# Patient Record
Sex: MALE | Race: WHITE | NOT HISPANIC OR LATINO | Employment: FULL TIME | URBAN - METROPOLITAN AREA
[De-identification: names, ages, dates, MRNs, and addresses within clinical notes are randomized per-mention and may not be internally consistent; named-entity substitution may affect disease eponyms.]

---

## 2018-02-19 ENCOUNTER — OFFICE VISIT (OUTPATIENT)
Dept: URGENT CARE | Facility: URGENT CARE | Age: 28
End: 2018-02-19
Payer: COMMERCIAL

## 2018-02-19 VITALS
DIASTOLIC BLOOD PRESSURE: 70 MMHG | WEIGHT: 195 LBS | RESPIRATION RATE: 20 BRPM | TEMPERATURE: 98.9 F | HEART RATE: 93 BPM | SYSTOLIC BLOOD PRESSURE: 120 MMHG

## 2018-02-19 DIAGNOSIS — R07.0 THROAT PAIN: Primary | ICD-10-CM

## 2018-02-19 DIAGNOSIS — R68.83 CHILLS (WITHOUT FEVER): ICD-10-CM

## 2018-02-19 LAB
BASOPHILS # BLD AUTO: 0 10E9/L (ref 0–0.2)
BASOPHILS NFR BLD AUTO: 0.2 %
DEPRECATED S PYO AG THROAT QL EIA: NORMAL
DIFFERENTIAL METHOD BLD: NORMAL
EOSINOPHIL # BLD AUTO: 0.1 10E9/L (ref 0–0.7)
EOSINOPHIL NFR BLD AUTO: 1.3 %
ERYTHROCYTE [DISTWIDTH] IN BLOOD BY AUTOMATED COUNT: 12.7 % (ref 10–15)
HCT VFR BLD AUTO: 44.1 % (ref 40–53)
HETEROPH AB SER QL: NEGATIVE
HGB BLD-MCNC: 14.8 G/DL (ref 13.3–17.7)
LYMPHOCYTES # BLD AUTO: 1.9 10E9/L (ref 0.8–5.3)
LYMPHOCYTES NFR BLD AUTO: 20.9 %
MCH RBC QN AUTO: 29.5 PG (ref 26.5–33)
MCHC RBC AUTO-ENTMCNC: 33.6 G/DL (ref 31.5–36.5)
MCV RBC AUTO: 88 FL (ref 78–100)
MONOCYTES # BLD AUTO: 0.4 10E9/L (ref 0–1.3)
MONOCYTES NFR BLD AUTO: 4.3 %
NEUTROPHILS # BLD AUTO: 6.6 10E9/L (ref 1.6–8.3)
NEUTROPHILS NFR BLD AUTO: 73.3 %
PLATELET # BLD AUTO: 221 10E9/L (ref 150–450)
RBC # BLD AUTO: 5.01 10E12/L (ref 4.4–5.9)
SPECIMEN SOURCE: NORMAL
WBC # BLD AUTO: 9 10E9/L (ref 4–11)

## 2018-02-19 PROCEDURE — 87880 STREP A ASSAY W/OPTIC: CPT | Performed by: PHYSICIAN ASSISTANT

## 2018-02-19 PROCEDURE — 99203 OFFICE O/P NEW LOW 30 MIN: CPT | Performed by: PHYSICIAN ASSISTANT

## 2018-02-19 PROCEDURE — 86308 HETEROPHILE ANTIBODY SCREEN: CPT | Performed by: PHYSICIAN ASSISTANT

## 2018-02-19 PROCEDURE — 85025 COMPLETE CBC W/AUTO DIFF WBC: CPT | Performed by: PHYSICIAN ASSISTANT

## 2018-02-19 PROCEDURE — 87081 CULTURE SCREEN ONLY: CPT | Performed by: PHYSICIAN ASSISTANT

## 2018-02-19 PROCEDURE — 36415 COLL VENOUS BLD VENIPUNCTURE: CPT | Performed by: PHYSICIAN ASSISTANT

## 2018-02-19 RX ORDER — IBUPROFEN 800 MG/1
800 TABLET, FILM COATED ORAL EVERY 8 HOURS PRN
Qty: 30 TABLET | Refills: 1 | Status: SHIPPED | OUTPATIENT
Start: 2018-02-19 | End: 2021-06-09

## 2018-02-19 RX ORDER — IBUPROFEN 800 MG/1
800 TABLET, FILM COATED ORAL EVERY 8 HOURS PRN
Qty: 30 TABLET | Refills: 1 | Status: SHIPPED | OUTPATIENT
Start: 2018-02-19 | End: 2018-02-19

## 2018-02-19 NOTE — NURSING NOTE
Chief Complaint   Patient presents with     Pharyngitis     st,swollen glands for 2 days,difficult to swallow       Initial /70 (Cuff Size: Adult Regular)  Pulse 93  Temp 98.9  F (37.2  C) (Oral)  Resp 20  Wt 195 lb (88.5 kg) There is no height or weight on file to calculate BMI.  Medication Reconciliation: complete Rose URIOSTEGUI

## 2018-02-19 NOTE — PROGRESS NOTES
SUBJECTIVE:  Jerson Moreno is a 27 year old male with a chief complaint of sore throat.  Onset of symptoms was 2 day(s) ago.    Course of illness: still present.  Severity mild and moderate  Current and Associated symptoms: throat pain  Treatment measures tried include Tylenol/Ibuprofen.  Predisposing factors include none.    No past medical history on file.     ALLERGIES  No Known Allergies     Social History   Substance Use Topics     Smoking status: Current Every Day Smoker     Smokeless tobacco: Never Used      Comment: 3-4 cigs per day     Alcohol use Not on file       ROS:  CONSTITUTIONAL:POSITIVE  for fever  INTEGUMENTARY/SKIN: NEGATIVE for worrisome rashes, moles or lesions  ENT/MOUTH: POSITIVE for throat pain  RESP:NEGATIVE for significant cough or SOB  CV: NEGATIVE for chest pain, palpitations or peripheral edema  GI: NEGATIVE for nausea, abdominal pain, heartburn, or change in bowel habits  MUSCULOSKELETAL: NEGATIVE for significant arthralgias or myalgia  NEURO: NEGATIVE for weakness, dizziness or paresthesias    OBJECTIVE:   /70 (Cuff Size: Adult Regular)  Pulse 93  Temp 98.9  F (37.2  C) (Oral)  Resp 20  Wt 195 lb (88.5 kg)  GENERAL APPEARANCE: healthy, alert and no distress  EYES: EOMI,  PERRL, conjunctiva clear  HENT: TM's normal bilaterally and tonsillar erythema  NECK: supple, non-tender to palpation, no adenopathy noted  RESP: lungs clear to auscultation - no rales, rhonchi or wheezes  CV: regular rates and rhythm, normal S1 S2, no murmur noted  SKIN: no suspicious lesions or rashes    Results for orders placed or performed in visit on 02/19/18   CBC with platelets differential   Result Value Ref Range    WBC 9.0 4.0 - 11.0 10e9/L    RBC Count 5.01 4.4 - 5.9 10e12/L    Hemoglobin 14.8 13.3 - 17.7 g/dL    Hematocrit 44.1 40.0 - 53.0 %    MCV 88 78 - 100 fl    MCH 29.5 26.5 - 33.0 pg    MCHC 33.6 31.5 - 36.5 g/dL    RDW 12.7 10.0 - 15.0 %    Platelet Count 221 150 - 450 10e9/L    Diff  Method Automated Method     % Neutrophils 73.3 %    % Lymphocytes 20.9 %    % Monocytes 4.3 %    % Eosinophils 1.3 %    % Basophils 0.2 %    Absolute Neutrophil 6.6 1.6 - 8.3 10e9/L    Absolute Lymphocytes 1.9 0.8 - 5.3 10e9/L    Absolute Monocytes 0.4 0.0 - 1.3 10e9/L    Absolute Eosinophils 0.1 0.0 - 0.7 10e9/L    Absolute Basophils 0.0 0.0 - 0.2 10e9/L   Mononucleosis screen   Result Value Ref Range    Mononucleosis Screen Negative NEG^Negative   Strep, Rapid Screen   Result Value Ref Range    Specimen Description Throat     Rapid Strep A Screen       NEGATIVE: No Group A streptococcal antigen detected by immunoassay, await culture report.         ASSESSMENT/PLAN:    ICD-10-CM    1. Throat pain R07.0 Strep, Rapid Screen     Beta strep group A culture     CBC with platelets differential     Mononucleosis screen     magic mouthwash (ENTER INGREDIENTS IN COMMENTS) suspension     ibuprofen (ADVIL/MOTRIN) 800 MG tablet     DISCONTINUED: ibuprofen (ADVIL/MOTRIN) 800 MG tablet   2. Chills (without fever) R68.83 Strep, Rapid Screen     Beta strep group A culture     CBC with platelets differential     Mononucleosis screen     magic mouthwash (ENTER INGREDIENTS IN COMMENTS) suspension     ibuprofen (ADVIL/MOTRIN) 800 MG tablet     DISCONTINUED: ibuprofen (ADVIL/MOTRIN) 800 MG tablet           Throat pain  Chills (without fever)    PLAN:   See orders in epic.   Symptomatic treat with gargles, lozenges, and OTC analgesic as needed. Follow-up with primary clinic if not improving.  Orders Placed This Encounter     CBC with platelets differential     Mononucleosis screen     DISCONTD: ibuprofen (ADVIL/MOTRIN) 800 MG tablet     magic mouthwash (ENTER INGREDIENTS IN COMMENTS) suspension     ibuprofen (ADVIL/MOTRIN) 800 MG tablet       Strep culture pending  Follow up as needed

## 2018-02-19 NOTE — LETTER
[facility] [place]  [address] [address]  [phone]      February 19, 2018    RE:  Jerson Moreno                                                                                                                                                       [address]  [facility] [address]            To whom it may concern:    Jerson Moreno was seen in the urgent care today for throat pain.  He will miss work today.         Sincerely,        Tee Singer [facility]

## 2018-02-19 NOTE — MR AVS SNAPSHOT
"              After Visit Summary   2018    Jerson Moreno    MRN: [id number]           Patient Information     Date Of Birth          1990        Visit Information        Provider Department      2018 9:10 AM Tee Singer PA-C [facility]        Today's Diagnoses     Throat pain    -  1    Chills (without fever)           Follow-ups after your visit        Who to contact     If you have questions or need follow up information about today's clinic visit or your schedule please contact [facility] directly at [phone].  Normal or non-critical lab and imaging results will be communicated to you by Presstlerhart, letter or phone within 4 business days after the clinic has received the results. If you do not hear from us within 7 days, please contact the clinic through Presstlerhart or phone. If you have a critical or abnormal lab result, we will notify you by phone as soon as possible.  Submit refill requests through RESAAS or call your pharmacy and they will forward the refill request to us. Please allow 3 business days for your refill to be completed.          Additional Information About Your Visit        MyChart Information     RESAAS lets you send messages to your doctor, view your test results, renew your prescriptions, schedule appointments and more. To sign up, go to [email or web address] . Click on \"Log in\" on the left side of the screen, which will take you to the Welcome page. Then click on \"Sign up Now\" on the right side of the page.     You will be asked to enter the access code listed below, as well as some personal information. Please follow the directions to create your username and password.     Your access code is: [email or web address]  Expires: 2018 10:20 AM     Your access code will  in 90 days. If you need help or a new code, please call your [place] clinic or [phone].        Care EveryWhere ID     This is your Care " EveryWhere ID. This could be used by other organizations to access your [facility] medical records  [id number]        Your Vitals Were     Pulse Temperature Respirations             93 98.9  F (37.2  C) (Oral) 20          Blood Pressure from Last 3 Encounters:   02/19/18 120/70    Weight from Last 3 Encounters:   02/19/18 195 lb (88.5 kg)              We Performed the Following     Beta strep group A culture     CBC with platelets differential     Mononucleosis screen     Strep, Rapid Screen          Today's Medication Changes          These changes are accurate as of 2/19/18 10:20 AM.  If you have any questions, ask your nurse or doctor.               Start taking these medicines.        Dose/Directions    ibuprofen 800 MG tablet   Commonly known as:  ADVIL/MOTRIN   Used for:  Chills (without fever), Throat pain   Started by:  Tee Singer PA-C        Dose:  800 mg   Take 1 tablet (800 mg) by mouth every 8 hours as needed for moderate pain   Quantity:  30 tablet   Refills:  1       magic mouthwash suspension   Commonly known as:  ENTER INGREDIENTS IN COMMENTS   Used for:  Throat pain, Chills (without fever)   Started by:  Tee Singer PA-C        Dose:  5-10 mL   Swish and spit 5-10 mLs in mouth every 6 hours as needed Pharmacy please compound 4 grams of carafate susp in 30 ml of Benadryl (12.5 mg/5 ml), 60 ml Maalox and 30 ml Viscous Lidocaine   Quantity:  120 mL   Refills:  0            Where to get your medicines      These medications were sent to [facility] [address] [place] [address] - [address] AT [facility] [address]  [address], [facility] [address]     Phone:  [phone]     ibuprofen 800 MG tablet         Some of these will need a paper prescription and others can be bought over the counter.  Ask your nurse if you have questions.     Bring a paper prescription for each of these medications     magic mouthwash suspension                Primary Care  Provider Fax #    Physician No Ref-Primary [phone]       No address on file        Equal Access to Services     JEREMIAH MARY : Hadii sherrell nelson ankush Mendoza, wajennda lulovely, anishata kajose arias, mary ann avinashin hayaan applepablo martin laJhoanviviane moser. So [facility] [phone].    ATENCIÓN: Si habla español, tiene a miranda disposición servicios gratuitos de asistencia lingüística. Llame al [phone].    We comply with applicable federal civil rights laws and [place] laws. We do not discriminate on the basis of race, color, national origin, age, disability, sex, sexual orientation, or gender identity.            Thank you!     Thank you for choosing [facility]  for your care. Our goal is always to provide you with excellent care. Hearing back from our patients is one way we can continue to improve our services. Please take a few minutes to complete the written survey that you may receive in the mail after your visit with us. Thank you!             Your Updated Medication List - Protect others around you: Learn how to safely use, store and throw away your medicines at www.disposemymeds.org.          This list is accurate as of 2/19/18 10:20 AM.  Always use your most recent med list.                   Brand Name Dispense Instructions for use Diagnosis    ibuprofen 800 MG tablet    ADVIL/MOTRIN    30 tablet    Take 1 tablet (800 mg) by mouth every 8 hours as needed for moderate pain    Chills (without fever), Throat pain       magic mouthwash suspension    ENTER INGREDIENTS IN COMMENTS    120 mL    Swish and spit 5-10 mLs in mouth every 6 hours as needed Pharmacy please compound 4 grams of carafate susp in 30 ml of Benadryl (12.5 mg/5 ml), 60 ml Maalox and 30 ml Viscous Lidocaine    Throat pain, Chills (without fever)

## 2018-02-20 LAB
BACTERIA SPEC CULT: NORMAL
SPECIMEN SOURCE: NORMAL

## 2018-03-14 ENCOUNTER — OFFICE VISIT (OUTPATIENT)
Dept: URGENT CARE | Facility: URGENT CARE | Age: 28
End: 2018-03-14
Payer: COMMERCIAL

## 2018-03-14 VITALS
SYSTOLIC BLOOD PRESSURE: 118 MMHG | HEART RATE: 70 BPM | WEIGHT: 194.4 LBS | RESPIRATION RATE: 16 BRPM | DIASTOLIC BLOOD PRESSURE: 68 MMHG | TEMPERATURE: 98.1 F

## 2018-03-14 DIAGNOSIS — M25.511 ACUTE PAIN OF RIGHT SHOULDER: Primary | ICD-10-CM

## 2018-03-14 PROCEDURE — 99213 OFFICE O/P EST LOW 20 MIN: CPT | Performed by: PHYSICIAN ASSISTANT

## 2018-03-14 RX ORDER — METHYLPREDNISOLONE 4 MG
TABLET, DOSE PACK ORAL
Qty: 21 TABLET | Refills: 0 | Status: SHIPPED | OUTPATIENT
Start: 2018-03-14 | End: 2019-08-08

## 2018-03-14 RX ORDER — NICOTINE 21 MG/24HR
1 PATCH, TRANSDERMAL 24 HOURS TRANSDERMAL
COMMUNITY
Start: 2018-01-30 | End: 2021-06-09

## 2018-03-14 NOTE — MR AVS SNAPSHOT
After Visit Summary   3/14/2018    Jerson Moreno    MRN: [id number]           Patient Information     Date Of Birth          1990        Visit Information        Provider Department      3/14/2018 4:10 PM Xiomara Rodriguez PA-C [facility]        Today's Diagnoses     Acute pain of right shoulder    -  1      Care Instructions    (M25.511) Acute pain of right shoulder  (primary encounter diagnosis)  Comment:   Plan: methylPREDNISolone (MEDROL DOSEPAK) 4 MG         tablet, ORTHOPEDICS ADULT REFERRAL          Follow up with Orthopedics           Follow-ups after your visit        Additional Services     ORTHOPEDICS ADULT REFERRAL       Your provider has referred you to: [facility] [phone]   [email or web address]    Please be aware that coverage of these services is subject to the terms and limitations of your health insurance plan.  Call member services at your health plan with any benefit or coverage questions.      Please bring the following to your appointment:    >>   Any x-rays, CTs or MRIs which have been performed.  Contact the facility where they were done to arrange for  prior to your scheduled appointment.    >>   List of current medications   >>   This referral request   >>   Any documents/labs given to you for this referral                  Who to contact     If you have questions or need follow up information about today's clinic visit or your schedule please contact [facility] directly at [phone].  Normal or non-critical lab and imaging results will be communicated to you by MyChart, letter or phone within 4 business days after the clinic has received the results. If you do not hear from us within 7 days, please contact the clinic through MyChart or phone. If you have a critical or abnormal lab result, we will notify you by phone as soon as possible.  Submit refill  "requests through Recurly or call your pharmacy and they will forward the refill request to us. Please allow 3 business days for your refill to be completed.          Additional Information About Your Visit        "[employer] [place] Information     Recurly lets you send messages to your doctor, view your test results, renew your prescriptions, schedule appointments and more. To sign up, go to [email or web address] . Click on \"Log in\" on the left side of the screen, which will take you to the Welcome page. Then click on \"Sign up Now\" on the right side of the page.     You will be asked to enter the access code listed below, as well as some personal information. Please follow the directions to create your username and password.     Your access code is: [email or web address]  Expires: 2018 11:20 AM     Your access code will  in 90 days. If you need help or a new code, please call your [facility] clinic or [phone].        Care EveryWhere ID     This is your Care EveryWhere ID. This could be used by other organizations to access your [facility] medical records  [id number]        Your Vitals Were     Pulse Temperature Respirations             70 98.1  F (36.7  C) (Oral) 16          Blood Pressure from Last 3 Encounters:   18 118/68   18 120/70    Weight from Last 3 Encounters:   18 194 lb 6.4 oz (88.2 kg)   18 195 lb (88.5 kg)              We Performed the Following     ORTHOPEDICS ADULT REFERRAL          Today's Medication Changes          These changes are accurate as of 3/14/18  5:17 PM.  If you have any questions, ask your nurse or doctor.               Start taking these medicines.        Dose/Directions    methylPREDNISolone 4 MG tablet   Commonly known as:  MEDROL DOSEPAK   Used for:  Acute pain of right shoulder   Started by:  Xiomara Rodriguez PA-C        Follow package instructions   Quantity:  21 tablet   Refills:  0            Where to get your medicines      These medications were " sent to [facility] - [place], [place] - [address] AT [facility] [place] [address]  [address], [facility] [address]     Phone:  [phone]     methylPREDNISolone 4 MG tablet                Primary Care Provider Fax #    Physician No Ref-Primary [phone]       No address on file        Equal Access to Services     JEREMIAH MARY : Hadii aad ku hadasho Soomaali, waaxda luqadaha, qaybta kaalmada adeegyada, waxay idiin hayaan adeeg kharash la'dayan ah. So [facility] [phone].    ATENCIÓN: Si habla espsarai, tiene a miranda disposición servicios gratuitos de asistencia lingüística. Arpaname al [phone].    We comply with applicable federal civil rights laws and [place] laws. We do not discriminate on the basis of race, color, national origin, age, disability, sex, sexual orientation, or gender identity.            Thank you!     Thank you for choosing [facility]  for your care. Our goal is always to provide you with excellent care. Hearing back from our patients is one way we can continue to improve our services. Please take a few minutes to complete the written survey that you may receive in the mail after your visit with us. Thank you!             Your Updated Medication List - Protect others around you: Learn how to safely use, store and throw away your medicines at www.disposemymeds.org.          This list is accurate as of 3/14/18  5:17 PM.  Always use your most recent med list.                   Brand Name Dispense Instructions for use Diagnosis    ibuprofen 800 MG tablet    ADVIL/MOTRIN    30 tablet    Take 1 tablet (800 mg) by mouth every 8 hours as needed for moderate pain    Chills (without fever), Throat pain       magic mouthwash suspension    ENTER INGREDIENTS IN COMMENTS    120 mL    Swish and spit 5-10 mLs in mouth every 6 hours as needed Pharmacy please compound 4 grams of carafate susp in 30 ml of Benadryl (12.5 mg/5 ml), 60 ml Maalox  and 30 ml Viscous Lidocaine    Throat pain, Chills (without fever)       methylPREDNISolone 4 MG tablet    MEDROL DOSEPAK    21 tablet    Follow package instructions    Acute pain of right shoulder       nicotine 21 MG/24HR 24 hr patch    NICODERM CQ     1 patch

## 2018-03-14 NOTE — LETTER
[facility]  [address] [address]  [phone]      March 14, 2018    RE:  Jerson BELLAMY Josh                                                                                                                                                       [address]  [facility] [address]            To whom it may concern:    Jerson BELLAMY Josh was seen in clinic today.  He may return to work on Friday.          Sincerely,        Xiomara Chery    [facility]

## 2018-03-14 NOTE — PATIENT INSTRUCTIONS
(M25.511) Acute pain of right shoulder  (primary encounter diagnosis)  Comment:   Plan: methylPREDNISolone (MEDROL DOSEPAK) 4 MG         tablet, ORTHOPEDICS ADULT REFERRAL          Follow up with Orthopedics

## 2018-03-14 NOTE — PROGRESS NOTES
SUBJECTIVE:  Chief Complaint   Patient presents with     Shoulder right     Rt shoulder pain x about 1 week      Jerson Moreno is a 27 year old male presents with a chief complaint of   1) right shoulder pain for the past week.  Onset was after holding himself up, hurt his right shoulder.  ROM is decreased.   Denies any numbness or tingling.      He has had right shoulder pain in the past that usually resolves with ice and ibuprofen.     Ice and ibuprofen are not offering much relief today.      Patient is right hand dominant.     No past medical history on file.     Previous right shoulder pain  Prostitis  Chemical dependency [id number]    There is no problem list on file for this patient.    Social History   Substance Use Topics     Smoking status: Current Every Day Smoker     Smokeless tobacco: Never Used      Comment: 3-4 cigs per day     Alcohol use Not on file       ROS:  CONSTITUTIONAL:NEGATIVE for fever, chills, change in weight  INTEGUMENTARY/SKIN: NEGATIVE for worrisome rashes, moles or lesions  MUSCULOSKELETAL: as per HPI  NEURO: NEGATIVE for weakness, dizziness or paresthesias  Review of systems negative except as stated above.    EXAM:   /68  Pulse 70  Temp 98.1  F (36.7  C) (Oral)  Resp 16  Wt 194 lb 6.4 oz (88.2 kg)  Gen: healthy,alert,no distress  Extremity: right shoulder: ROM is decreased secondary to pain.  There is not compromise to the distal circulation.  GENERAL APPEARANCE: healthy, alert and no distress  SKIN: no suspicious lesions or rashes  NEURO: Normal strength and tone, sensory exam grossly normal, mentation intact and speech normal    X-RAY was not done.    (M25.511) Acute pain of right shoulder  (primary encounter diagnosis)  Comment:   Plan: methylPREDNISolone (MEDROL DOSEPAK) 4 MG         tablet, ORTHOPEDICS ADULT REFERRAL          Follow up with Orthopedics   Patient expresses understanding and agreement with the assessment and plan as above.

## 2019-08-08 ENCOUNTER — OFFICE VISIT (OUTPATIENT)
Dept: URGENT CARE | Facility: URGENT CARE | Age: 29
End: 2019-08-08
Payer: COMMERCIAL

## 2019-08-08 VITALS
OXYGEN SATURATION: 100 % | TEMPERATURE: 98.3 F | WEIGHT: 199 LBS | DIASTOLIC BLOOD PRESSURE: 68 MMHG | HEART RATE: 60 BPM | RESPIRATION RATE: 16 BRPM | HEIGHT: 71 IN | SYSTOLIC BLOOD PRESSURE: 118 MMHG | BODY MASS INDEX: 27.86 KG/M2

## 2019-08-08 DIAGNOSIS — M54.2 DISCOMFORT OF NECK: Primary | ICD-10-CM

## 2019-08-08 DIAGNOSIS — M25.561 POSTERIOR KNEE PAIN, RIGHT: ICD-10-CM

## 2019-08-08 DIAGNOSIS — R00.1 BRADYCARDIA: ICD-10-CM

## 2019-08-08 DIAGNOSIS — R07.89 CHEST DISCOMFORT: ICD-10-CM

## 2019-08-08 LAB
D DIMER PPP FEU-MCNC: <0.3 UG/ML FEU (ref 0–0.5)
TROPONIN I SERPL-MCNC: <0.015 UG/L (ref 0–0.04)

## 2019-08-08 PROCEDURE — 85379 FIBRIN DEGRADATION QUANT: CPT | Performed by: FAMILY MEDICINE

## 2019-08-08 PROCEDURE — 93000 ELECTROCARDIOGRAM COMPLETE: CPT | Performed by: FAMILY MEDICINE

## 2019-08-08 PROCEDURE — 99215 OFFICE O/P EST HI 40 MIN: CPT | Performed by: FAMILY MEDICINE

## 2019-08-08 PROCEDURE — 84484 ASSAY OF TROPONIN QUANT: CPT | Performed by: FAMILY MEDICINE

## 2019-08-08 PROCEDURE — 36415 COLL VENOUS BLD VENIPUNCTURE: CPT | Performed by: FAMILY MEDICINE

## 2019-08-08 RX ORDER — BUPROPION HYDROCHLORIDE 300 MG/1
TABLET ORAL
Refills: 1 | COMMUNITY
Start: 2019-06-16 | End: 2021-06-09

## 2019-08-08 ASSESSMENT — MIFFLIN-ST. JEOR: SCORE: 1894.79

## 2019-08-08 NOTE — PATIENT INSTRUCTIONS
Try heat packs to the back of the leg and take tylenol/ibuprofen every 4-6 hours for pain  -- if pain hasn't improved in the next week see your doctor  -- if the discomfort gets worse please return right away to be seen    If throat /neck discomfort gets worse return immediately to be re-evaluated    If you have difficulty breathing or chest pain please return to be seen

## 2019-08-08 NOTE — PROGRESS NOTES
"Subjective:   Jerson Moreno is a 28 year old male who presents for   Chief Complaint   Patient presents with     Urgent Care     Pain behind right knee, chest pain, and tightness around neck for 2 days     Denies FH of blood clots or early heart attack  Hx of stroke in his grandfather in mid fifties    Right knee discomfort that has a burning/itchy/hotness sensation. Apparently there was some tenderness in the back of knee possibly along the tendon. May have tweaked his knee a week ago but nothing significant. Can walk on his leg just fine, actually may make it worse. No swelling in his leg.     Over the last day he has this heaviness in his upper chest around his collar bone. Can breath deep just fine. No left sided chest discomfort. Denies injuries or lifting. Denies hx of allergies. Denies sore throat. Slight lightheadedness with maybe some blurry vision when he woke up in the middle of the night then happened again today, lasts 30 secs long. No headache reported. NO neck pain. Eating and rinking    No hx of anxiety but wonders if he is making himself anxious    SH: current smoker approximately 10-11 cigs/day. Accompanied today by his girlfriend. Smokes cannabis but no use today    Meds: wellbutrin      There are no active problems to display for this patient.      Current Outpatient Medications   Medication     buPROPion (WELLBUTRIN XL) 300 MG 24 hr tablet     ibuprofen (ADVIL/MOTRIN) 800 MG tablet     nicotine (NICODERM CQ) 21 MG/24HR 24 hr patch     No current facility-administered medications for this visit.        ROS:   ROS: 10 point ROS neg other than the symptoms noted above in the HPI.    Objective:   /68   Pulse 60   Temp 98.3  F (36.8  C) (Oral)   Resp 16   Ht 1.803 m (5' 11\")   Wt 90.3 kg (199 lb)   SpO2 100%   BMI 27.75 kg/m  , Body mass index is 27.75 kg/m .  Gen:   well-nourished, sitting in chair comfortably, normal mentation  HEENT: EOMI, sclera anicteric, Head normocephalic, ; nares " patent; moist mucous membranes, 3+ tonsils without trismus  Neck: trachea midline, no thyromegaly, normal ROM  CV:  Hemodynamically stable, normal rate on my exam, using machine HR shows rate of 66-68  Pulm:  no increased work of breathing , CTAB, no wheezes/rales/rhonchi   R knee: slight discomfort of posterior knee with palpation but no palpable masses  Extrem: no cyanosis, edema or clubbing  Skin: no obvious rashes or abnormalities  Psych: mildly anxious, linear thoughts, normal rate of speech  Neuro : CN II-XII intact  Gait: normal  MSK: gross movement of all 4 extremities    Results for orders placed or performed in visit on 08/08/19   D dimer, quantitative   Result Value Ref Range    D Dimer <0.3 0.0 - 0.50 ug/ml FEU   Troponin I   Result Value Ref Range    Troponin I ES <0.015 0.000 - 0.045 ug/L     EKG: rate 51, sinus bradycardia, , QTc 419    Assessment & Plan:   Jerson Moreno, 28 year old male who presents with:    Chest discomfort  Has risk factors with his FH and his body habitus. Fortunately has a normal EKG and negative troponin. Without breathing difficulties, has normal lung exam and stable vital signs. The discomfort is more along is upper chest around collar bone area. I see no masses or swelling. No crepitus felt. Close monitoring of symptoms but reassurance provided. Symptoms not consistent with indigestion. If worsening symptoms return immediately to be seen.   - D dimer, quantitative  - Troponin I  - EKG 12-lead complete w/read - Clinics    Discomfort of neck  Normal ROM, without fevers and has normal mentation.     Bradycardia  Rate 51 on EKG but on exam and using pulse ox machine he is in the mid 60's. Asymptomatic without lightheadedness/dizziness.     Posterior knee pain, right  Exam not convincing for blood clot. Stable gait. Mild discomfort. D-dimer negative which is reassuring. Possible strain of a hamstring tendon or muscle, I recommended ibuprofen/tylenol and heat packs.   This  seems to be a minor complaint in the big picture of his urgent care visit today      Thai Dodson MD   [facility]    The use of Dragon/[employer] dictation services may have been used to construct the content in this note; any grammatical or spelling errors are non-intentional. Please contact the author of this note directly if you are in need of any clarification.

## 2019-08-19 ENCOUNTER — OFFICE VISIT (OUTPATIENT)
Dept: ORTHOPEDICS | Facility: CLINIC | Age: 29
End: 2019-08-19
Payer: COMMERCIAL

## 2019-08-19 ENCOUNTER — OFFICE VISIT (OUTPATIENT)
Dept: URGENT CARE | Facility: URGENT CARE | Age: 29
End: 2019-08-19
Payer: COMMERCIAL

## 2019-08-19 VITALS
HEART RATE: 63 BPM | WEIGHT: 198.5 LBS | DIASTOLIC BLOOD PRESSURE: 71 MMHG | OXYGEN SATURATION: 97 % | SYSTOLIC BLOOD PRESSURE: 131 MMHG | BODY MASS INDEX: 27.69 KG/M2 | TEMPERATURE: 97.9 F

## 2019-08-19 VITALS
SYSTOLIC BLOOD PRESSURE: 131 MMHG | HEIGHT: 71 IN | BODY MASS INDEX: 27.72 KG/M2 | WEIGHT: 198 LBS | DIASTOLIC BLOOD PRESSURE: 71 MMHG

## 2019-08-19 DIAGNOSIS — M79.604 PAIN IN BOTH LOWER EXTREMITIES: ICD-10-CM

## 2019-08-19 DIAGNOSIS — M54.50 ACUTE BILATERAL LOW BACK PAIN WITHOUT SCIATICA: ICD-10-CM

## 2019-08-19 DIAGNOSIS — M79.605 PAIN IN BOTH LOWER EXTREMITIES: ICD-10-CM

## 2019-08-19 DIAGNOSIS — M54.9 CHRONIC BILATERAL BACK PAIN, UNSPECIFIED BACK LOCATION: Primary | ICD-10-CM

## 2019-08-19 DIAGNOSIS — G89.29 CHRONIC BILATERAL BACK PAIN, UNSPECIFIED BACK LOCATION: Primary | ICD-10-CM

## 2019-08-19 DIAGNOSIS — M62.830 LUMBAR PARASPINAL MUSCLE SPASM: Primary | ICD-10-CM

## 2019-08-19 PROCEDURE — 99214 OFFICE O/P EST MOD 30 MIN: CPT | Performed by: FAMILY MEDICINE

## 2019-08-19 PROCEDURE — 99204 OFFICE O/P NEW MOD 45 MIN: CPT | Performed by: FAMILY MEDICINE

## 2019-08-19 RX ORDER — CYCLOBENZAPRINE HCL 5 MG
TABLET ORAL
Qty: 30 TABLET | Refills: 0 | Status: SHIPPED | OUTPATIENT
Start: 2019-08-19 | End: 2021-06-09

## 2019-08-19 RX ORDER — METHYLPREDNISOLONE 4 MG
TABLET, DOSE PACK ORAL
Qty: 21 TABLET | Refills: 0 | Status: SHIPPED | OUTPATIENT
Start: 2019-08-19 | End: 2019-08-25

## 2019-08-19 ASSESSMENT — ENCOUNTER SYMPTOMS
SENSORY CHANGE: 0
TINGLING: 0
BACK PAIN: 1
FOCAL WEAKNESS: 0

## 2019-08-19 ASSESSMENT — MIFFLIN-ST. JEOR: SCORE: 1890.25

## 2019-08-19 NOTE — LETTER
8/19/2019         RE: Jerson Moreno  [id number]  [facility] [address]        Dear Colleague,    Thank you for referring your patient, Jerson Moreno, to the  SPORTS MEDICINE. Please see a copy of my visit note below.    [facility]   Clinic Visit s Aug 19, 2019    PCP: [facility]      Jerson is a 28 year old male who is seen as self referral for   Chief Complaint   Patient presents with     Lower Back - Pain       Injury: Reports insidious onset without acute precipitating event.      Location of Pain: bilateral low back , radiating to bilateral posterior upper legs   Duration of Pain: 3 week(s)  Rating of Pain at worst: 8/10  Rating of Pain Currently: 6/10  Pain is better with: activity avoidance, laying down  Pain is worse with: driving, sitting,   Treatment so far consists of: cannabis, rest  Associated symptoms: numbness and tingling in leg  Recent imaging completed: No recent imaging completed.  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 No pertinent family history.    Social History     Socioeconomic History     Marital status: Single     Spouse name: Not on file     Number of children: Not on file     Years of education: Not on file     Highest education level: Not on file   Occupational History     Not on file   Social Needs     Financial  "resource strain: Not on file     Food insecurity:     Worry: Not on file     Inability: Not on file     Transportation needs:     Medical: Not on file     Non-medical: Not on file   Tobacco Use     Smoking status: Current Every Day Smoker     Smokeless tobacco: Never Used     Tobacco comment: 3-4 cigs per day   Substance and Sexual Activity     Alcohol use: Not on file     Drug use: Not on file       Past Surgical History:   Procedure Laterality Date     NO HISTORY OF SURGERY             Review of Systems   Musculoskeletal: Positive for back pain.   Neurological: Negative for tingling, sensory change and focal weakness.   All other systems reviewed and are negative.        Physical Exam  /71   Ht 1.803 m (5' 11\")   Wt 89.8 kg (198 lb)   BMI 27.62 kg/m     Constitutional:well-developed, well-nourished, and in no distress.   Cardiovascular: Intact distal pulses.    Neurological: alert. Gait shuffling, difficulty transitioning from sit to stand, then loosens up  Skin: Skin is warm and dry.   Psychiatric: Mood and affect normal.   Respiratory: unlabored, speaks in full sentences  Lymph: no LAD, no lymphangitis            Back Exam     Tenderness   The patient is experiencing tenderness in the sacroiliac.    Range of Motion   Extension: 20 (no pain, limited motion)   Flexion: abnormal Back flexion: hands to mid shins, \"tight\"   Lateral bend right: normal   Lateral bend left: abnormal Back lateral bend left: painful to left.   Rotation right: normal   Back rotation left: painful to left.     Muscle Strength   The patient has normal back strength.    Tests   Straight leg raise right: negative  Straight leg raise left: negative    Other   Sensation: normal  Gait: normal   Erythema: no back redness  Scars: absent          ASSESSMENT/PLAN    ICD-10-CM    1. Lumbar paraspinal muscle spasm M62.830 cyclobenzaprine (FLEXERIL) 5 MG tablet   2. Acute bilateral low back pain without sciatica M54.5 LUCINDA PT, HAND, AND " CHIROPRACTIC REFERRAL     Acute low back pain with some radiating referred pain but not radicular in nature.  He was given oral steroids by urgent care and I encouraged him to proceed with this.  We will add muscle relaxers for spasms that are affecting sleep and to send him to acute spine rehab.  Follow-up as needed.  Letter written to reduce work hours for 1 week then return if tolerated.      Again, thank you for allowing me to participate in the care of your patient.        Sincerely,        Marlon Stone MD

## 2019-08-19 NOTE — LETTER
SPORTS MEDICINE  [address]  [facility] [address]  Phone: [phone]  Fax: [phone]    08/19/19    Jerson Moreno  [address] [place] [address]      To whom it may concern:     Jerson has a low back injury. He should be limited to half days for the next 7 days.     Sincerely,      Marlon Stone MD

## 2019-08-19 NOTE — PROGRESS NOTES
[facility]   Clinic Visit s Aug 19, 2019    PCP: [place]      Jerson is a 28 year old male who is seen as self referral for   Chief Complaint   Patient presents with     Lower Back - Pain       Injury: Reports insidious onset without acute precipitating event.      Location of Pain: bilateral low back , radiating to bilateral posterior upper legs   Duration of Pain: 3 week(s)  Rating of Pain at worst: 8/10  Rating of Pain Currently: 6/10  Pain is better with: activity avoidance, laying down  Pain is worse with: driving, sitting,   Treatment so far consists of: cannabis, rest  Associated symptoms: numbness and tingling in leg  Recent imaging completed: No recent imaging completed.  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 No pertinent family history.    Social History     Socioeconomic History     Marital status: Single     Spouse name: Not on file     Number of children: Not on file     Years of education: Not on file     Highest education level: Not on file   Occupational History     Not on file   Social Needs     Financial resource strain: Not on file     Food insecurity:     Worry: Not on file     Inability: Not on file     Transportation needs:     Medical: Not on file     Non-medical: Not on file   Tobacco Use     Smoking status: Current Every Day  "Smoker     Smokeless tobacco: Never Used     Tobacco comment: 3-4 cigs per day   Substance and Sexual Activity     Alcohol use: Not on file     Drug use: Not on file       Past Surgical History:   Procedure Laterality Date     NO HISTORY OF SURGERY             Review of Systems   Musculoskeletal: Positive for back pain.   Neurological: Negative for tingling, sensory change and focal weakness.   All other systems reviewed and are negative.        Physical Exam  /71   Ht 1.803 m (5' 11\")   Wt 89.8 kg (198 lb)   BMI 27.62 kg/m    Constitutional:well-developed, well-nourished, and in no distress.   Cardiovascular: Intact distal pulses.    Neurological: alert. Gait shuffling, difficulty transitioning from sit to stand, then loosens up  Skin: Skin is warm and dry.   Psychiatric: Mood and affect normal.   Respiratory: unlabored, speaks in full sentences  Lymph: no LAD, no lymphangitis            Back Exam     Tenderness   The patient is experiencing tenderness in the sacroiliac.    Range of Motion   Extension: 20 (no pain, limited motion)   Flexion: abnormal Back flexion: hands to mid shins, \"tight\"   Lateral bend right: normal   Lateral bend left: abnormal Back lateral bend left: painful to left.   Rotation right: normal   Back rotation left: painful to left.     Muscle Strength   The patient has normal back strength.    Tests   Straight leg raise right: negative  Straight leg raise left: negative    Other   Sensation: normal  Gait: normal   Erythema: no back redness  Scars: absent          ASSESSMENT/PLAN    ICD-10-CM    1. Lumbar paraspinal muscle spasm M62.830 cyclobenzaprine (FLEXERIL) 5 MG tablet   2. Acute bilateral low back pain without sciatica M54.5 LUCINDA PT, HAND, AND CHIROPRACTIC REFERRAL     Acute low back pain with some radiating referred pain but not radicular in nature.  He was given oral steroids by urgent care and I encouraged him to proceed with this.  We will add muscle relaxers for spasms that " are affecting sleep and to send him to acute spine rehab.  Follow-up as needed.  Letter written to reduce work hours for 1 week then return if tolerated.

## 2019-08-19 NOTE — LETTER
[facility] [facility]  [address] [address]  [phone]      August 19, 2019    RE:  Jerson Moreno                                                                                                                                                       [id number]  [facility] [place] [address]            To whom it may concern:    Jerson Moreno is under my professional care for Medical.   He  may return to work after recheck with Orthopedics.          Sincerely,        Eduin Perez    [place] [facility]

## 2019-08-19 NOTE — PROGRESS NOTES
SUBJECTIVE:  Chief Complaint   Patient presents with     Urgent Care     Pt states sitting for long period of times causea bilatetral leg pain sxs 3x week    .ident who presents with a chief complaint of  bilateral back and legs pain.  Symptoms began 3 week(s) ago , are moderate andstill present.  Context:Injury: no  Pain exacerbated by movement   Relieved bynothing He treated it initially with no therapy.   This is the first time this type of injury has occurred to this patient.     No past medical history on file.    No past surgical history on file.    No family history on file.    Social History     Tobacco Use     Smoking status: Current Every Day Smoker     Smokeless tobacco: Never Used     Tobacco comment: 3-4 cigs per day   Substance Use Topics     Alcohol use: Not on file      No Known Allergies    ROS:CONSTITUTIONAL:NEGATIVE for fever, chills, change in weight  INTEGUMENTARY/SKIN: NEGATIVE for worrisome rashes, moles or lesions    EXAM: /71   Pulse 63   Temp 97.9  F (36.6  C) (Oral)   Wt 90 kg (198 lb 8 oz)   SpO2 97%   BMI 27.69 kg/m    Exam:leg and back  tenderness to palpation and pain with rom  GENERAL APPEARANCE: healthy, alert and no distress  EXTREMITIES: peripheral pulses normal  SKIN: no suspicious lesions or rashes  NEURO: Normal strength and tone, sensory exam grossly normal, mentation intact and speech normal      ASSESSMENT:     ICD-10-CM    1. Chronic bilateral back pain, unspecified back location M54.9 methylPREDNISolone (MEDROL DOSEPAK) 4 MG tablet therapy pack    G89.29 ORTHOPEDICS ADULT REFERRAL   2. Pain in both lower extremities M79.604 methylPREDNISolone (MEDROL DOSEPAK) 4 MG tablet therapy pack    M79.605 ORTHOPEDICS ADULT REFERRAL

## 2019-08-26 ENCOUNTER — THERAPY VISIT (OUTPATIENT)
Dept: PHYSICAL THERAPY | Facility: CLINIC | Age: 29
End: 2019-08-26
Attending: FAMILY MEDICINE
Payer: COMMERCIAL

## 2019-08-26 DIAGNOSIS — M54.50 ACUTE BILATERAL LOW BACK PAIN WITHOUT SCIATICA: ICD-10-CM

## 2019-08-26 DIAGNOSIS — M54.50 ACUTE LEFT-SIDED LOW BACK PAIN WITHOUT SCIATICA: ICD-10-CM

## 2019-08-26 PROCEDURE — 97112 NEUROMUSCULAR REEDUCATION: CPT | Mod: GP | Performed by: PHYSICAL THERAPIST

## 2019-08-26 PROCEDURE — 97161 PT EVAL LOW COMPLEX 20 MIN: CPT | Mod: GP | Performed by: PHYSICAL THERAPIST

## 2019-08-26 PROCEDURE — 97110 THERAPEUTIC EXERCISES: CPT | Mod: GP | Performed by: PHYSICAL THERAPIST

## 2019-08-26 NOTE — PROGRESS NOTES
"[facility] Initial Evaluation -- Lumbar    Date: August 26, 2019  Jerson Moreno is a 28 year old male with a B thigh condition.   Referral: [facility]--Dr. Stone  Work mechanical stresses:  Sitting, computer work, lifting/carrying  Employment status:  [employer]  Leisure mechanical stresses: sedentary--oralia--sits in office chair   Functional disability score (SHERMAN/STarT Back):  18%  VAS score (0-10): 0/10  Patient goals/expectations:  To be able to sit without leg pain.    HISTORY:    Present symptoms: R>L posterior thigh pain, B LB \"tenderness\"  Pain quality (sharp/shooting/stabbing/aching/burning/cramping):  aching   Paresthesia (yes/no):  no    Present since (onset date): 07/26/2019.     Symptoms (improving/unchanging/worsening):  improving.     Symptoms commenced as a result of: unknown   Condition occurred in the following environment:   unknown     Symptoms at onset (back/thigh/leg): R posterior thigh pain to the knee  Constant symptoms (back/thigh/leg): R>L posterior thigh pain to the knees  Intermittent symptoms (back/thigh/leg): none    Symptoms are made worse with the following: Always Sitting 20 minutes; time of day--no effect, always when still  Symptoms are made better with the following: Always Walking and Always Lying supine, always walking and standing    Disturbed sleep (yes/no):  Yes--less than 1 hour lost/night Sleeping postures (prone/sup/side R/L): supine, sides    Previous episodes (0/1-5/6-10/11+): 1 Year of first episode: 2017  Previous history: LBP 2017--muscle relaxers--resolved  Previous treatments: 7-day prednisone pack--finished today      Specific Questions:  Cough/Sneeze/Strain (pos/neg): negative  Bowel/Bladder (normal/abnormal): normal bowel; has complaints of frequent urinary urgency and sometimes unable to urinate fully--plans to f/u with GP since he had prostatitis 2 years ago.  Gait (normal/abnormal): normal  Medications " "(nil/NSAIDS/analg/steroids/anticoag/other):  Muscle relaxants, Steroids and Other - Anti-depressants and anti-inflammatories  Medical allergies:  none  General health (excellent/good/fair/poor):  good  Pertinent medical history:  Chemical dependency, Depression, Mental illness, Overweight and Smoking  Imaging (None/Xray/MRI/Other):  none  Recent or major surgery (yes/no):  No; history of cyst removal from face, ear 2007  Night pain (yes/no): no  Accidents (yes/no): no  Unexplained weight loss (yes/no): no  Barriers at home: no  Other red flags: no    EXAMINATION    Posture:   Sitting (good/fair/poor): poor  Standing (good/fair/poor):good  Lordosis (red/acc/normal): red  Correction of posture (better/worse/no effect): NE    Lateral Shift (right/left/nil): nil  Relevant (yes/no):  na  Other Observations: na    Neurological:    Motor deficit:  normal  Reflexes:  Not assessed  Sensory deficit:  normal  Dural signs:  Not assessed    Movement Loss:   Marcos Mod Min Nil Pain   Flexion    x NE   Extension  x   \"stiff\"   Side Gliding R    x NE   Side Gliding L    x \"stiff\"     Test Movements:   During: produces, abolishes, increases, decreases, no effect, centralizing, peripheralizing   After: better, worse, no better, no worse, no effect, centralized, peripheralized    Pretest symptoms standing:    Symptoms During Symptoms After ROM increased ROM decreased No Effect   FIS        Rep FIS        EIS        Rep EIS        Pretest symptoms lying: B thighs--0/10    Symptoms During Symptoms After ROM increased ROM decreased No Effect   ANY        Rep ANY        EIL No Effect No Effect      Rep EIL No Effect Better--less stiff extension    x     If required, pretest symptoms:    Symptoms During Symptoms After ROM increased ROM decreased No Effect   SGIS - R        Rep SGIS - R        SGIS - L        Rep SGIS - L          Static Tests:  Sitting slouched:    Sitting erect:    Standing slouched   Standing erect:    Lying prone in " extension:   Long sitting:      Other Tests:     Provisional Classification:  Derangement - Asymmetrical, unilateral, symptoms above knee    Principle of Management:  Education:  Posture--use of lumbar roll in sitting, avoid flexion, body mechanics, hinge pivot   Equipment provided:  none  Mechanical therapy (Y/N):  y   Extension principle:  REIL x10 reps, every 2 hours; goal 100 reps/day  Lateral Principle:    Flexion principle:    Other:      ASSESSMENT/PLAN:    Patient is a 28 year old male with lumbar complaints.  Provisional classification of derangement with directional preference for extension.  Symptoms were mild with evaluation today.  He has decreased lumbar extension ROM which improved with repeated lumbar extension exercises in lying.  He should respond well to a lumbar extension program in addition to posture and body mechanics training to improve mechanics, decrease pain, and improve overall function.     Patient has the following significant findings with corresponding treatment plan.                Diagnosis 1:  B thigh pain--lumbar  Pain -  self management, education, directional preference exercise and home program  Decreased ROM/flexibility - manual therapy, therapeutic exercise and home program  Decreased function - therapeutic activities and home program  Impaired posture - neuro re-education and home program    Cumulative Therapy Evaluation is: Low complexity.    Previous and current functional limitations:  (See Goal Flow Sheet for this information)    Short term and Long term goals: (See Goal Flow Sheet for this information)     Communication ability:  Patient appears to be able to clearly communicate and understand verbal and written communication and follow directions correctly.  Treatment Explanation - The following has been discussed with the patient:   RX ordered/plan of care  Anticipated outcomes  Possible risks and side effects  This patient would benefit from PT intervention to resume  normal activities.   Rehab potential is good.    Frequency:  1 X week, once daily  Duration:  for 4 weeks tapering to 2 X a month over 1 month  Discharge Plan:  Achieve all LTG.  Independent in home treatment program.  Reach maximal therapeutic benefit.    Please refer to the daily flowsheet for treatment today, total treatment time and time spent performing 1:1 timed codes.

## 2019-09-05 ENCOUNTER — THERAPY VISIT (OUTPATIENT)
Dept: PHYSICAL THERAPY | Facility: CLINIC | Age: 29
End: 2019-09-05
Attending: FAMILY MEDICINE
Payer: COMMERCIAL

## 2019-09-05 DIAGNOSIS — M54.50 ACUTE LEFT-SIDED LOW BACK PAIN WITHOUT SCIATICA: ICD-10-CM

## 2019-09-05 PROCEDURE — 97110 THERAPEUTIC EXERCISES: CPT | Mod: GP | Performed by: PHYSICAL THERAPIST

## 2019-09-05 PROCEDURE — 97112 NEUROMUSCULAR REEDUCATION: CPT | Mod: GP | Performed by: PHYSICAL THERAPIST

## 2019-09-05 PROCEDURE — 97140 MANUAL THERAPY 1/> REGIONS: CPT | Mod: GP | Performed by: PHYSICAL THERAPIST

## 2019-09-12 ENCOUNTER — THERAPY VISIT (OUTPATIENT)
Dept: PHYSICAL THERAPY | Facility: CLINIC | Age: 29
End: 2019-09-12
Attending: FAMILY MEDICINE
Payer: COMMERCIAL

## 2019-09-12 DIAGNOSIS — M54.50 ACUTE LEFT-SIDED LOW BACK PAIN WITHOUT SCIATICA: ICD-10-CM

## 2019-09-12 PROCEDURE — 97140 MANUAL THERAPY 1/> REGIONS: CPT | Mod: GP | Performed by: PHYSICAL THERAPIST

## 2019-09-12 PROCEDURE — 97530 THERAPEUTIC ACTIVITIES: CPT | Mod: GP | Performed by: PHYSICAL THERAPIST

## 2019-09-12 PROCEDURE — 97110 THERAPEUTIC EXERCISES: CPT | Mod: GP | Performed by: PHYSICAL THERAPIST

## 2019-09-12 NOTE — PROGRESS NOTES
Subjective:  HPI                    Objective:  System    Physical Exam    General     ROS    Assessment/Plan:    DISCHARGE REPORT    Progress reporting period is from 08/26/2019 to 09/12/2019.       SUBJECTIVE  Subjective: Patient reports he has not had the leg pain that he had when he first started.  He still has LBP but this has been an issue for quite some time.  Had increased LBP 2 nights ago after sitting for nearly the full 8 hours at work.  Went home and did the exercises which helped, and he felt fine the next morning.  He feels 85-90% back to normal at this point.      Current Pain level: 2/10.     Initial Pain level: 0/10.   Changes in function:  Yes, no sleep interruptions due to LBP, improved sitting tolerance.  Adverse reaction to treatment or activity: None    OBJECTIVE  Objective: Lumbar AROM:  flexion WNL, NE; extension 66%, NE.  Good response to extension exercises and mobilizations with consistently decreased pain.         ASSESSMENT/PLAN  Patient has been seen for 3 visits with treatment focusing on lumbar extension exercises and mobilizations in addition to posture and body mechanics training to address LB and B thigh pain.  He has responded well to lumbar extension, and symptoms have centralized to the LB.  He should be able to abolish his symptoms as he continues with his exercises over time.  He will continue with his HEP independently at this point.    Updated problem list and treatment plan: Diagnosis 1:  LBP  Pain -  self management, education, directional preference exercise and home program  Decreased ROM/flexibility - home program  Decreased function - home program  STG/LTGs have been met or progress has been made towards goals:  Yes (See Goal flow sheet completed today.)  Assessment of Progress: The patient's condition is improving.  Self Management Plans:  Patient has been instructed in a home treatment program.  Patient is independent in a home treatment program.  Patient  has been  instructed in self management of symptoms.  Patient is independent in self management of symptoms.  Jerson continues to require the following intervention to meet STG and LTG's:  PT intervention is no longer required to meet STG/LTG.    Recommendations:  This patient is ready to be discharged from therapy and continue their home treatment program.    Please refer to the daily flowsheet for treatment today, total treatment time and time spent performing 1:1 timed codes.

## 2020-02-05 ENCOUNTER — OFFICE VISIT (OUTPATIENT)
Dept: URGENT CARE | Facility: URGENT CARE | Age: 30
End: 2020-02-05
Payer: COMMERCIAL

## 2020-02-05 VITALS
WEIGHT: 198 LBS | OXYGEN SATURATION: 98 % | RESPIRATION RATE: 16 BRPM | TEMPERATURE: 98.1 F | SYSTOLIC BLOOD PRESSURE: 115 MMHG | HEART RATE: 64 BPM | DIASTOLIC BLOOD PRESSURE: 62 MMHG | BODY MASS INDEX: 27.62 KG/M2

## 2020-02-05 DIAGNOSIS — N50.812 TESTICULAR PAIN, LEFT: Primary | ICD-10-CM

## 2020-02-05 LAB
ALBUMIN UR-MCNC: NEGATIVE MG/DL
APPEARANCE UR: CLEAR
BILIRUB UR QL STRIP: NEGATIVE
COLOR UR AUTO: YELLOW
GLUCOSE UR STRIP-MCNC: NEGATIVE MG/DL
HGB UR QL STRIP: ABNORMAL
KETONES UR STRIP-MCNC: NEGATIVE MG/DL
LEUKOCYTE ESTERASE UR QL STRIP: NEGATIVE
NITRATE UR QL: NEGATIVE
PH UR STRIP: 6.5 PH (ref 5–7)
RBC #/AREA URNS AUTO: NORMAL /HPF
SOURCE: ABNORMAL
SP GR UR STRIP: 1.01 (ref 1–1.03)
UROBILINOGEN UR STRIP-ACNC: 0.2 EU/DL (ref 0.2–1)
WBC #/AREA URNS AUTO: NORMAL /HPF

## 2020-02-05 PROCEDURE — 87491 CHLMYD TRACH DNA AMP PROBE: CPT | Performed by: PHYSICIAN ASSISTANT

## 2020-02-05 PROCEDURE — 99213 OFFICE O/P EST LOW 20 MIN: CPT | Performed by: PHYSICIAN ASSISTANT

## 2020-02-05 PROCEDURE — 87591 N.GONORRHOEAE DNA AMP PROB: CPT | Performed by: PHYSICIAN ASSISTANT

## 2020-02-05 PROCEDURE — 81001 URINALYSIS AUTO W/SCOPE: CPT | Performed by: PHYSICIAN ASSISTANT

## 2020-02-05 RX ORDER — SULFAMETHOXAZOLE/TRIMETHOPRIM 800-160 MG
1 TABLET ORAL 2 TIMES DAILY
Qty: 20 TABLET | Refills: 0 | Status: SHIPPED | OUTPATIENT
Start: 2020-02-05 | End: 2020-02-15

## 2020-02-05 ASSESSMENT — ENCOUNTER SYMPTOMS
HEADACHES: 0
DIARRHEA: 0
FOCAL WEAKNESS: 0
HEMATURIA: 0
DYSURIA: 0
FLANK PAIN: 0
CHILLS: 0
ABDOMINAL PAIN: 0
VOMITING: 0
SHORTNESS OF BREATH: 0
FEVER: 0
FREQUENCY: 0
NAUSEA: 0

## 2020-02-05 NOTE — PROGRESS NOTES
HPI  February 5, 2020    HPI: Jerson Moreno is a 29 year old male who complains of moderate L testicular pain onset 10 days ago. Pain is described as a constant dull ache; it does not radiate. Symptoms are constant in duration. No treatments tried. Denies urinary sx, testicular swelling, fever/chills, abd pain, flank pain, penile discharge, genital sores/rash, N/V/D, or any other symptoms. Pt is sexually active with no new partners; he uses condoms.    Past Medical History:   Diagnosis Date     NO ACTIVE PROBLEMS      Past Surgical History:   Procedure Laterality Date     NO HISTORY OF SURGERY       Social History     Tobacco Use     Smoking status: Current Every Day Smoker     Smokeless tobacco: Never Used     Tobacco comment: 3-4 cigs per day   Substance Use Topics     Alcohol use: None     Drug use: None     Patient Active Problem List   Diagnosis     Depressive disorder     Drug abuse, marijuana     Hypercholesteremia     IVDU (intravenous drug user)     Acute left-sided low back pain without sciatica     No family history on file.     Problem list, Medication list, Allergies, and Medical/Social/Surgical histories reviewed in [facility] and updated as appropriate.      Review of Systems   Constitutional: Negative for chills and fever.   Respiratory: Negative for shortness of breath.    Cardiovascular: Negative for chest pain.   Gastrointestinal: Negative for abdominal pain, diarrhea, nausea and vomiting.   Genitourinary: Negative for dysuria, flank pain, frequency and hematuria.        Testicular pain   Skin: Negative for rash.   Neurological: Negative for focal weakness and headaches.   All other systems reviewed and are negative.        Physical Exam  Vitals signs and nursing note reviewed.   HENT:      Head: Normocephalic and atraumatic.   Cardiovascular:      Rate and Rhythm: Normal rate and regular rhythm.      Heart sounds: Normal heart sounds.   Pulmonary:      Effort: Pulmonary effort is normal.      Breath  sounds: Normal breath sounds.   Abdominal:      General: Abdomen is flat. Bowel sounds are normal.      Palpations: Abdomen is soft.      Tenderness: There is no abdominal tenderness. There is no right CVA tenderness or left CVA tenderness.   Genitourinary:     Penis: Normal.       Scrotum/Testes:         Left: Tenderness present. Mass or swelling not present.      Epididymis:      Left: Tenderness present.   Musculoskeletal: Normal range of motion.   Skin:     General: Skin is warm and dry.   Neurological:      Mental Status: He is alert and oriented to person, place, and time.       Vital Signs  /62   Pulse 64   Temp 98.1  F (36.7  C) (Oral)   Resp 16   Wt 89.8 kg (198 lb)   SpO2 98%   BMI 27.62 kg/m       Diagnostic Test Results:  Results for orders placed or performed in visit on 02/05/20 (from the past 24 hour(s))   *UA reflex to Microscopic and Culture ([facility] (except [place] and [place])   Result Value Ref Range    Color Urine Yellow     Appearance Urine Clear     Glucose Urine Negative NEG^Negative mg/dL    Bilirubin Urine Negative NEG^Negative    Ketones Urine Negative NEG^Negative mg/dL    Specific Gravity Urine 1.015 1.003 - 1.035    Blood Urine Trace (A) NEG^Negative    pH Urine 6.5 5.0 - 7.0 pH    Protein Albumin Urine Negative NEG^Negative mg/dL    Urobilinogen Urine 0.2 0.2 - 1.0 EU/dL    Nitrite Urine Negative NEG^Negative    Leukocyte Esterase Urine Negative NEG^Negative    Source Midstream Urine    Urine Microscopic   Result Value Ref Range    WBC Urine 0 - 5 OTO5^0 - 5 /HPF    RBC Urine O - 2 OTO2^O - 2 /HPF       ASSESSMENT/PLAN      ICD-10-CM    1. Testicular pain, left N50.812 *UA reflex to Microscopic and Culture ([place] and [facility] (except [facility])     Neisseria gonorrhoeae PCR     Chlamydia trachomatis PCR     Urine Microscopic     sulfamethoxazole-trimethoprim (BACTRIM DS) 800-160 MG tablet      UA unremarkable. Doubt  nephrolithiasis given constant dull ache and no CVAT or flank pain. Also doubt testicular torsion due to dull pain of 10 days' duration without acute worsening or swelling of testicle. Suspect epididymitis due to tenderness at the epididymis. G & C tests pending. Will also cover for urinary tract pathogens with Bactrim. Avoid flouroquinolones due to risk of resistance in case of gonorrhea infection. If pain worsens or pt notes swelling, he is go to the ER for imaging.      I have discussed any lab or imaging results, the patient's diagnosis, and my plan of treatment with the patient and/or family. Patient is aware to come back in if with worsening symptoms or if no relief despite treatment plan.  Patient voiced understanding and had no further questions.       Follow Up: Return in about 3 days (around 2/8/2020) for Follow up w/ primary care provider if not better.    NICOLE Hurley, PA-C  [facility]

## 2020-02-05 NOTE — PATIENT INSTRUCTIONS
If your pain worsens or you notice fever or testicular swelling, go to the ER.  We will contact you if your STD tests come back positive.    Patient Education     Epididymitis-suspected  Inflammation of the epididymis can cause pain and swelling in your scrotum. The epididymis is a small tube next to the testicle that stores sperm. Epididymitis is usually caused by an infection. In sexually active men, it is often caused by a sexually transmitted disease (STD) such as chlamydia or gonorrhea. In boys and in men over 40, it can be from bacteria from other parts of the urinary tract (not an STD infection).  Symptoms may begin with pain in the lower belly (abdomen) or low back. The pain then spreads down into the scrotum. Usually only one side is affected. The testicle and scrotum swell and become very painful and red. You may have fever and a burning when passing urine. Sometimes you may have a discharge from the penis.  Treatment is with antibiotics, and anti-inflammatory and pain medicines. The condition should get better over the first few days of treatment. But it will take several weeks for all the swelling and discomfort to go away. If your healthcare provider suspects that an STD is the cause, your sexual partners may need to be treated.  Home care  The following will help you care for yourself at home:    Support the scrotum. When lying down, place a rolled towel under the scrotum. When walking, use an athletic supporter or 2 pairs of jockey-style underwear.    To relieve pain, put ice packs on the inflamed area. You can make your own ice pack by putting ice cubes in a sealed plastic bag wrapped in a thin towel.    You may use over-the-counter medicines to control pain, unless another medicine was given. If you have chronic liver or kidney disease, talk with your healthcare provider before taking these medicines. Also talk with your provider if you've ever had a stomach ulcer or GI bleeding.    Rest in bed for  the first few days until the fever, pain, and swelling get better. It may take several weeks for all of the swelling to go away.    Constipation can make you strain. This makes the pain worse. Avoid constipation by eating natural laxatives such as prunes, fresh fruits, and whole-grain cereals. If necessary, use a mild over-the-counter laxative for constipation. Mineral oil can be used to keep the stools soft.    Do not have sex until you have finished all treatment and all symptoms have cleared.    Take all medicine as directed. Do not miss any doses and do not stop early even if you feel better.  Follow-up care  Follow up with your healthcare provider, or as advised, to be sure you are responding properly to treatment. If a culture was taken, you may call for the result as directed. A culture test can ensure that you are on the correct antibiotic.   When to seek medical advice  Call your healthcare provider right away if any of these occur:    Fever of 100.4 F (38 C), or as directed by your healthcare provider    Increasing pain or swelling of the testicle after starting treatment    Pressure or pain in your bladder that gets worse    Unable to pass urine for 8 hours  Date Last Reviewed: 9/1/2016 2000-2019 [email or web address]. [address] [place] [address], [place] [address]. All rights reserved. This information is not intended as a substitute for professional medical care. Always follow your healthcare professional's instructions.

## 2020-02-06 LAB
C TRACH DNA SPEC QL NAA+PROBE: NEGATIVE
N GONORRHOEA DNA SPEC QL NAA+PROBE: NEGATIVE
SPECIMEN SOURCE: NORMAL
SPECIMEN SOURCE: NORMAL

## 2020-02-09 ENCOUNTER — APPOINTMENT (OUTPATIENT)
Dept: ULTRASOUND IMAGING | Facility: CLINIC | Age: 30
End: 2020-02-09
Attending: EMERGENCY MEDICINE
Payer: COMMERCIAL

## 2020-02-09 ENCOUNTER — OFFICE VISIT (OUTPATIENT)
Dept: URGENT CARE | Facility: URGENT CARE | Age: 30
End: 2020-02-09
Payer: COMMERCIAL

## 2020-02-09 ENCOUNTER — HOSPITAL ENCOUNTER (EMERGENCY)
Facility: CLINIC | Age: 30
Discharge: HOME | End: 2020-02-09
Attending: EMERGENCY MEDICINE | Admitting: EMERGENCY MEDICINE
Payer: COMMERCIAL

## 2020-02-09 VITALS
SYSTOLIC BLOOD PRESSURE: 108 MMHG | OXYGEN SATURATION: 96 % | HEART RATE: 71 BPM | RESPIRATION RATE: 15 BRPM | TEMPERATURE: 98.1 F | DIASTOLIC BLOOD PRESSURE: 52 MMHG

## 2020-02-09 VITALS
SYSTOLIC BLOOD PRESSURE: 120 MMHG | HEART RATE: 86 BPM | BODY MASS INDEX: 27.62 KG/M2 | RESPIRATION RATE: 20 BRPM | TEMPERATURE: 99.5 F | DIASTOLIC BLOOD PRESSURE: 70 MMHG | WEIGHT: 198 LBS

## 2020-02-09 DIAGNOSIS — Z53.9 ERRONEOUS ENCOUNTER--DISREGARD: Primary | ICD-10-CM

## 2020-02-09 DIAGNOSIS — N50.812 LEFT TESTICULAR PAIN: ICD-10-CM

## 2020-02-09 LAB
ALBUMIN UR-MCNC: NEGATIVE MG/DL
APPEARANCE UR: CLEAR
BILIRUB UR QL STRIP: NEGATIVE
COLOR UR AUTO: ABNORMAL
GLUCOSE UR STRIP-MCNC: NEGATIVE MG/DL
HGB UR QL STRIP: NEGATIVE
KETONES UR STRIP-MCNC: NEGATIVE MG/DL
LEUKOCYTE ESTERASE UR QL STRIP: NEGATIVE
MUCOUS THREADS #/AREA URNS LPF: PRESENT /LPF
NITRATE UR QL: NEGATIVE
PH UR STRIP: 6 PH (ref 5–7)
RBC #/AREA URNS AUTO: 1 /HPF (ref 0–2)
SOURCE: ABNORMAL
SP GR UR STRIP: 1.02 (ref 1–1.03)
SQUAMOUS #/AREA URNS AUTO: <1 /HPF (ref 0–1)
UROBILINOGEN UR STRIP-MCNC: NORMAL MG/DL (ref 0–2)
WBC #/AREA URNS AUTO: 1 /HPF (ref 0–5)

## 2020-02-09 PROCEDURE — 81001 URINALYSIS AUTO W/SCOPE: CPT | Performed by: EMERGENCY MEDICINE

## 2020-02-09 PROCEDURE — 99284 EMERGENCY DEPT VISIT MOD MDM: CPT | Mod: 25

## 2020-02-09 PROCEDURE — 93976 VASCULAR STUDY: CPT

## 2020-02-09 PROCEDURE — 25000132 ZZH RX MED GY IP 250 OP 250 PS 637: Performed by: EMERGENCY MEDICINE

## 2020-02-09 RX ORDER — IBUPROFEN 600 MG/1
600 TABLET, FILM COATED ORAL ONCE
Status: COMPLETED | OUTPATIENT
Start: 2020-02-09 | End: 2020-02-09

## 2020-02-09 RX ADMIN — IBUPROFEN 600 MG: 600 TABLET, FILM COATED ORAL at 20:50

## 2020-02-09 ASSESSMENT — ENCOUNTER SYMPTOMS
CHILLS: 0
HEMATURIA: 0
VOMITING: 0
FEVER: 0
NAUSEA: 0
DYSURIA: 1

## 2020-02-09 NOTE — ED AVS SNAPSHOT
[facility] Emergency Department  [address]  [facility] [address]  Phone:  [phone]  Fax:  [phone]                                    Jerson Moreno   MRN: [id number]    Department:  [facility] Emergency Department   Date of Visit:  2/9/2020           After Visit Summary Signature Page    I have received my discharge instructions, and my questions have been answered. I have discussed any challenges I see with this plan with the nurse or doctor.    ..........................................................................................................................................  Patient/Patient Representative Signature      ..........................................................................................................................................  Patient Representative Print Name and Relationship to Patient    ..................................................               ................................................  Date                                   Time    ..........................................................................................................................................  Reviewed by Signature/Title    ...................................................              ..............................................  Date                                               Time          22EPIC Rev 08/18

## 2020-02-10 NOTE — ED PROVIDER NOTES
Received sign out from Dr. Khan to follow up on US    US testicles- IMPRESSION:  1.  Normal ultrasound of the scrotum    Will discharge with follow up with Urology     Michael Miranda MD  02/09/20 [id number]

## 2020-02-10 NOTE — ED TRIAGE NOTES
Pt states seen at [facility] twice within past week for left testicle pain and tenderness. Pt states placed on antibiotic for possible epididymitis after first visit. States pain has not improved despite antibiotic. ABCs intact GCS 15

## 2020-02-10 NOTE — PROGRESS NOTES
Patient presents with persistent left testicular pain, was seen in urgent care on February 5, 2020.  He was treated with Bactrim for suspected epididymitis, STD screen was negative.  Physical examination remarkable for left epididymal and inguinal tenderness.  Differentials discussed in detail.  Needs further evaluation/imaging to delineate a specific diagnosis.  Suggested to go ER.  Patient understood and in agreement with above plan.  All questions answered    Alan Crespo MD  [facility]

## 2020-02-10 NOTE — ED PROVIDER NOTES
History     Chief Complaint:  Testicular/Scrotal Pain    HPI   Jerson Moreno is a 29 year old male who presents to the ED for evaluation of left testicular/scrotal pain. The patient reports he developed dull testicular pain 2 weeks ago. He has a numbness sensation when sitting, and the pain worsens with walking and standing. He has the most pain with lying flat. He had similar pain x2 approximately 2 years ago with negative ultrasounds. He was diagnosed with presumed prostatitis which resolved with antibiotics. The patient notes he noticed mild dysuria 2 nights ago. His urine output has decreased. He also notices that it takes longer for him to start urinating, but there are no complications once he begins voiding. He was seen at urgent care 4 days ago and was prescribed Bactrim. He visited urgent care again this evening due to no relief even with antibiotics and was advised to visit the ED. 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 The patient denies any fever, chills, nausea, vomiting, hematuria, penile discharge, or other symptoms/complaints. He denies history of kidney stones.       Laboratory, 2/5/2020  UA: Blood trace, o/w Negative   Chlamydia PCR: Negative  Gonorrhoea PCR: Negative     Allergies:  No known drug allergies    Medications:    Wellbutrin  Bactrim   Trazodone  Prozac    Past Medical History:    Depression   HLD    Past Surgical History:    Cystectomy     Family History:    Chronic back pain, colon cancer s/p resection: father  Thyroid disorder: mother     Social History:  Smoking status: Current every day smoker, x3-4 cigs/day   Presents to ED alone    Marital Status:  Single [1]     Review of Systems   Constitutional: Negative for chills and fever.   Gastrointestinal: Negative for nausea and vomiting.   Genitourinary: Positive for decreased urine volume, dysuria and testicular pain. Negative for discharge and hematuria.   All other systems reviewed and are  negative.    Physical Exam     Patient Vitals for the past 24 hrs:   BP Temp Temp src Pulse Heart Rate Resp SpO2   02/09/20 2100 108/52 -- -- 71 -- -- 96 %   02/09/20 2050 139/61 -- -- 69 -- -- 100 %   02/09/20 2025 -- -- -- -- -- -- 97 %   02/09/20 2020 -- -- -- -- -- -- 94 %   02/09/20 2015 (!) 142/80 -- -- 71 -- -- --   02/09/20 1924 136/76 98.1  F (36.7  C) Temporal -- 88 15 99 %     Physical Exam  General: Patient is awake, alert and interactive when I enter the room, appears uncomfortable.   Eyes: Conjunctivae and sclerae are normal  Neck: Normal range of motion. No anterior cervical lymphadenopathy noted  CV: Regular rate. S1/S2. No murmurs.   Resp: Lungs are clear without wheezes or rales. No respiratory distress.   GI: Abdomen is soft, no rigidity, guarding, or rebound. No distension. No tenderness to palpation in any quadrant.  : Penis: no lesions, no pus from urethral meatus  Perineum: no groin lymphadenopathy  Scrotum: no lesions. No erythema. Left testicular and epididymis tenderness to palpation. Cremaster reflex intact.   Lower abdomen: No hernias noted.    MS: moving all extremities.   Skin: No rash or lesions noted. Normal capillary refill noted  Neuro: Speech is normal and fluent. Face is symmetric.   Psych:  Normal affect.  Appropriate interactions.    Emergency Department Course     Imaging:  Radiographic findings were communicated with the patient who voiced understanding of the findings.    US Testicular & Scrotum w Doppler Ltd    (Results Pending)     Laboratory:  Laboratory findings were communicated with the patient who voiced understanding of the findings.    UA: Mucous present, o/w Negative     Interventions:  2050: Ibuprofen 600mg PO    Emergency Department Course:  Past medical records, nursing notes, and vitals reviewed.    2017: I performed an exam of the patient as documented above.     2049: The patient provided a urine sample here in the emergency department. This was sent for  laboratory testing, findings above.    The patient was sent for a testicular and scrotum ultrasound while in the emergency department, results above.     I rechecked the patient and discussed the results of his workup thus far.       Impression & Plan      Medical Decision Making:  Patient is an otherwise healthy 29-year-old male who presents emergency department today with left-sided testicular pain that is been ongoing for the past 2 weeks.  He was seen and evaluated by urgent care and was started on Bactrim a few days ago and has been faithfully taking it without any significant relief of his symptoms.  Here in the emergency department patient denies any significant urethral discharge, rash or any specific trauma to the area.  UA does not show any signs of infection here in the emergency department.  Ultrasound is currently pending.    Diagnosis:    ICD-10-CM    1. Left testicular pain N50.812        Disposition:   Signed out to my colleague Dr. Ruth Torres  2/9/2020   [facility] EMERGENCY DEPARTMENT    Scribe Disclosure:  I, Elena Torres, am serving as a scribe at 8:17 PM on 2/9/2020 to document services personally performed by Malcolm Khan MD based on my observations and the provider's statements to me.        Malcolm Khan MD  02/09/20 2289

## 2020-10-21 ENCOUNTER — VIRTUAL VISIT (OUTPATIENT)
Dept: FAMILY MEDICINE | Facility: OTHER | Age: 30
End: 2020-10-21

## 2020-10-22 NOTE — PROGRESS NOTES
"Date: 10/21/2020 20:04:27  Clinician: Harmony Gold  Clinician NPI: [id number]  Patient: Jerson Moreno  Patient : 1990  Patient Address: [address] [place] [address]  Patient Phone: [phone]  Visit Protocol: URI  Patient Summary:  Jerson is a 29 year old ( : 1990 ) male who initiated a [facility] Visit for COVID-19 (Coronavirus) evaluation and screening. When asked the question \"Please sign me up to receive news, health information and promotions. \", Jerson responded \"No\".    Jerson states his symptoms started 1-2 days ago.   His symptoms consist of a headache, a cough, nasal congestion, rhinitis, facial pain or pressure, myalgia, chills, malaise, a sore throat, and tooth pain.   Symptom details     Nasal secretions: The color of his mucus is white and clear.    Cough: Jerson coughs a few times an hour and his cough is not more bothersome at night. Phlegm does not come into his throat when he coughs. He does not believe his cough is caused by post-nasal drip.     Sore throat: Jerson reports having mild throat pain (1-3 on a 10 point pain scale), does not have exudate on his tonsils, and can swallow liquids. The lymph nodes in his neck are not enlarged. A rash has not appeared on the skin since the sore throat started.     Facial pain or pressure: The facial pain or pressure does not feel worse when bending or leaning forward.     Headache: He states the headache is moderate (4-6 on a 10 point pain scale).     Tooth pain: The tooth pain is not caused by a cavity, recent dental work, or other mouth problems.      Jerson denies having ear pain, wheezing, fever, enlarged lymph nodes, anosmia, vomiting, nausea, ageusia, and diarrhea. He also denies taking antibiotic medication in the past month and having recent facial or sinus surgery in the past 60 days. He is not experiencing dyspnea.   Precipitating events  Jerson is not sure if he has been exposed to someone with strep throat. He " has not recently been exposed to someone with influenza. Jerson has not been in close contact with any high risk individuals.   Pertinent COVID-19 (Coronavirus) information  In the past 14 days, Jerson has not worked in a congregate living setting.   He does not work or volunteer as healthcare worker or a  and does not work or volunteer in a healthcare facility.   Jerson also has not lived in a congregate living setting in the past 14 days. He does not live with a healthcare worker.   Jerson has not had a close contact with a laboratory-confirmed COVID-19 patient within 14 days of symptom onset.   Since December 2019, Jerson and has had upper respiratory infection (URI) or influenza-like illness. Has not been diagnosed with lab-confirmed COVID-19 test      Date(s) of previous URI or influenza-like illness (free-text): Early January 2020     Symptoms Jerson experienced during previous URI or influenza-like illness as reported by the patient (free-text): Body soreness, nausea, diarrhea        Pertinent medical history  Jerson needs a return to work/school note.   Weight: 210 lbs   Jerson smokes or uses smokeless tobacco.   Weight: 210 lbs    MEDICATIONS: ibuprofen oral, ALLERGIES: NKDA  Clinician Response:  Dear Jerson,  Based on the information provided, you have a viral upper respiratory infection, otherwise known as a cold. Symptoms vary from person to person, but can include sneezing, coughing, a runny nose, sore throat, and headache and range from mild to severe.  Unfortunately, there are no medications that can cure a cold, so treatment is focused on controlling symptoms as much as possible. Most people gradually feel better until symptoms are gone in 1-2 weeks.  Medication information  Because you have a viral infection, antibiotics will not help you get better. Treating a viral infection with antibiotics could actually make you feel worse.  Self care  Steps you can take to be as comfortable as  possible:     Rest.    Drink plenty of fluids.    Take a warm shower to loosen congestion    Use a cool-mist humidifier.    Use throat lozenges.    Suck on frozen items such as popsicles.    Drink hot tea with lemon and honey.    Gargle with warm salt water (1/4 teaspoon of salt per 8 ounce glass of water).    Take a spoonful of honey to reduce your cough.     Also, as your provider, I need you to know that becoming tobacco-free is the most important thing you can do to protect your current and future health.  When to seek care  Please be seen in a clinic or urgent care if any of the following occur:   New symptoms develop, or symptoms become worse   Call ahead before going to the clinic or urgent care.  Call 911 or go to the emergency room if you feel that your throat is closing off, you suddenly develop a rash, you are unable to swallow fluids, you are drooling, or you are having difficulty breathing.  Additional treatment plan   Your symptoms show that you may have coronavirus (COVID-19). This illness can cause fever, cough and trouble breathing. Many people get a mild case and get better on their own. Some people can get very sick.  Based on the symptoms you have shared, I would like you to be re-checked in 2 to 3 days. Please call your family clinic to set up a video or phone visit.  Will I be tested for COVID-19?  We would like to test you for this virus.   Please call [phone] to schedule your visit. Explain that you were referred by [facility] to have a COVID-19 test. Be ready to share your [facility] visit ID number.   The following will serve as your written order for this COVID Test, ordered by me, for the indication of suspected COVID [Z20.828]: The test will be ordered in [facility], our electronic health record, after you are scheduled. It will show as ordered and authorized by Ming Thompson MD.  Order: COVID-19 (Coronavirus) PCR for SYMPTOMATIC testing from [facility].  1.When it's time for your COVID test:   Stay at  "least 6 feet away from others. (If someone will drive you to your test, stay in the backseat, as far away from the  as you can.)   Cover your mouth and nose with a mask, tissue or washcloth.  Go straight to the testing site. Don't make any stops on the way there or back.      2.Starting now: Stay home and away from others (self-isolate) until:   You've had no fever---and no medicine that reduces fever---for one full day (24 hours). And...   Your other symptoms have gotten better. For example, your cough or breathing has improved. And...   At least 10 days have passed since your symptoms started.       During this time, don't leave the house except for testing or medical care.   Stay in your own room, even for meals. Use your own bathroom if you can.   Stay away from others in your home. No hugging, kissing or shaking hands. No visitors.  Don't go to work, school or anywhere else.    Clean \"high touch\" surfaces often (doorknobs, counters, handles, etc.). Use a household cleaning spray or wipes. You'll find a full list of  on the EPA website: www.epa.gov/pesticide-registration/list-n-disinfectants-use-against-sars-cov-2.   Cover your mouth and nose with a mask, tissue or washcloth to avoid spreading germs.  Wash your hands and face often. Use soap and water.  Caregivers in these groups are at risk for severe illness due to COVID-19:  o People 65 years and older  o People who live in a nursing home or long-term care facility  o People with chronic disease (lung, heart, cancer, diabetes, kidney, liver, immunologic)   o People who have a weakened immune system, including those who:   Are in cancer treatment  Take medicine that weakens the immune system, such as corticosteroids  Had a bone marrow or organ transplant  Have an immune deficiency  Have poorly controlled HIV or AIDS  Are obese (body mass index of 40 or higher)  Smoke regularly   o Caregivers should wear gloves while washing dishes, handling " laundry and cleaning bedrooms and bathrooms.  o Use caution when washing and drying laundry: Don't shake dirty laundry, and use the warmest water setting that you can.  o For more tips, go to www.cdc.gov/coronavirus/2019-ncov/downloads/10Things.pdf.      How can I take care of myself?   Get lots of rest. Drink extra fluids (unless a doctor has told you not to)   Take Tylenol (acetaminophen) for fever or pain. If you have liver or kidney problems, ask your family doctor if it's okay to take Tylenol.   Adults can take either:    650 mg (two 325 mg pills) every 4 to 6 hours, or...   1,000 mg (two 500 mg pills) every 8 hours as needed.    Note: Don't take more than 3,000 mg in one day. Acetaminophen is found in many medicines (both prescribed and over-the-counter medicines). Read all labels to be sure you don't take too much.   For children, check the Tylenol bottle for the right dose. The dose is based on the child's age or weight.    If you have other health problems (like cancer, heart failure, an organ transplant or severe kidney disease): Call your specialty clinic if you don't feel better in the next 2 days.       Know when to call 911. Emergency warning signs include:    Trouble breathing or shortness of breath Pain or pressure in the chest that doesn't go away Feeling confused like you haven't felt before, or not being able to wake up Bluish-colored lips or face  Where can I get more information?   [facility] -- About COVID-19: [email or web address]   CDC -- What to Do If You're Sick: www.cdc.gov/coronavirus/2019-ncov/about/steps-when-sick.html   CDC -- Ending Home Isolation: www.cdc.gov/coronavirus/2019-ncov/hcp/disposition-in-home-patients.html   CDC -- Caring for Someone: www.cdc.gov/coronavirus/2019-ncov/if-you-are-sick/care-for-someone.html   [facility] -- Interim Guidance for Hospital Discharge to Home: www.health.[facility].mn.us/diseases/coronavirus/hcp/hospdischarge.pdf   [facility]  clinical trials (COVID-19 research studies): clinicalaffairs.[facility]/n-clinical-trials    Below are the COVID-19 hotlines at the [place] Department of Health ([facility]). Interpreters are available.    For health questions: Call 674-455-0537 or 1-498.781.3349 (7 a.m. to 7 p.m.) For questions about schools and childcare: Call 209-023-0090 or 1-306.675.1731 (7 a.m. to 7 p.m.)       Diagnosis: Contact with and (suspected) exposure to other viral communicable diseases  Diagnosis ICD: Z20.828

## 2020-10-23 ENCOUNTER — AMBULATORY - HEALTHEAST (OUTPATIENT)
Dept: INTERNAL MEDICINE | Facility: CLINIC | Age: 30
End: 2020-10-23

## 2020-10-23 DIAGNOSIS — Z20.822 SUSPECTED 2019 NOVEL CORONAVIRUS INFECTION: ICD-10-CM

## 2020-10-24 ENCOUNTER — AMBULATORY - HEALTHEAST (OUTPATIENT)
Dept: FAMILY MEDICINE | Facility: CLINIC | Age: 30
End: 2020-10-24

## 2020-10-24 DIAGNOSIS — Z20.822 SUSPECTED 2019 NOVEL CORONAVIRUS INFECTION: ICD-10-CM

## 2020-10-27 ENCOUNTER — COMMUNICATION - HEALTHEAST (OUTPATIENT)
Dept: SCHEDULING | Facility: CLINIC | Age: 30
End: 2020-10-27

## 2020-12-14 ENCOUNTER — HEALTH MAINTENANCE LETTER (OUTPATIENT)
Age: 30
End: 2020-12-14

## 2021-06-02 ENCOUNTER — NURSE TRIAGE (OUTPATIENT)
Dept: NURSING | Facility: CLINIC | Age: 31
End: 2021-06-02

## 2021-06-02 NOTE — TELEPHONE ENCOUNTER
Triage call:     Trying to quit smoking. Would like to be seen- would like to have a fill of smoking cessation medication.     Has been smoking for 12 years- smoking half pack a day  Has tried to quit before- able to quit for 4-5 months- has tried chantix but was unable to complete, patches, gum, and mints. Patient is very motivated to quit smoking.     Advised to be seen within 2 weeks. Patient agreeable to doing so. Assisted in transferring to scheduling.     Maribel Bahena RN BSN 6/2/2021 9:25 AM    COVID 19 Nurse Triage Plan/Patient Instructions    Please be aware that novel coronavirus (COVID-19) may be circulating in the community. If you develop symptoms such as fever, cough, or SOB or if you have concerns about the presence of another infection including coronavirus (COVID-19), please contact your health care provider or visit [email or web address].     Disposition/Instructions    In-Person Visit with provider recommended. Reference Visit Selection Guide.    Thank you for taking steps to prevent the spread of this virus.  o Limit your contact with others.  o Wear a simple mask to cover your cough.  o Wash your hands well and often.    Resources    [facility]: About COVID-19: [email or web address]    CDC: What to Do If You're Sick: www.cdc.gov/coronavirus/2019-ncov/about/steps-when-sick.html    CDC: Ending Home Isolation: www.cdc.gov/coronavirus/2019-ncov/hcp/disposition-in-home-patients.html     CDC: Caring for Someone: www.cdc.gov/coronavirus/2019-ncov/if-you-are-sick/care-for-someone.html     [facility]: Interim Guidance for Hospital Discharge to Home: www.health.[facility].mn.us/diseases/coronavirus/hcp/hospdischarge.pdf    [facility] clinical trials (COVID-19 research studies): clinicalaffairs.[facility]/umn-clinical-trials     Below are the COVID-19 hotlines at the [place] Department of Health ([facility]). Interpreters are available.   o For health questions: Call 813-464-4056 or  1-888.710.7093 (7 a.m. to 7 p.m.)  o For questions about schools and childcare: Call 159-679-2612 or 1-732.360.8316 (7 a.m. to 7 p.m.)       Reason for Disposition    Patient wants to be seen    Additional Information    Negative: Patient sounds very sick or weak to the triager    Protocols used: SMOKING - TOBACCO ABUSE AND [id number]

## 2021-06-08 NOTE — PROGRESS NOTES
"    Assessment & Plan     Tobacco abuse  Patient establishing care today.  Would like to quit smoking.  He is down to about half a pack a day.  He has tried Chantix in the past and had side effects, the patches do not work very well for him, and the gum can sometimes be too strong.  He feels the lesions he has been trying over-the-counter has been helpful.  We will continue those  - nicotine (COMMIT) 2 MG lozenge; Place 1 lozenge (2 mg) inside cheek every hour as needed for smoking cessation    History of depression  Had a guided session with his significant other last summer, he feels his depression is significantly improved.  Denies the need for medication at this time.    History of hyperlipidemia  Patient needs to return for full physical and fasting labs.  He states that in 2014 this was high but then moved follow-up recheck a year later was normal.  He has strong family history of hyperlipidemia    History of opioid abuse (H)  Patient reports history of opioid abuse.    Lumbar paraspinal muscle spasm  Patient sometimes gets back spasms, will like refill of this  - cyclobenzaprine (FLEXERIL) 5 MG tablet; Take 1-2 tabs at night as needed for spasms affecting sleep    Tobacco Cessation:   reports that he has been smoking. He has never used smokeless tobacco.  Tobacco Cessation Action Plan: Pharmacotherapies : other Nicotine replacement    BMI:   Estimated body mass index is 27.69 kg/m  as calculated from the following:    Height as of this encounter: 1.803 m (5' 11\").    Weight as of this encounter: 90 kg (198 lb 8 oz).     Return in about 6 months (around 12/9/2021), or if symptoms worsen or fail to improve.  NICOLE Munson, NP-C  [facility]   Jerson is a 30 year old who presents for the following health issues  accompanied by his self:    HPI     New Patient/Transfer of Care  Concern - smoking cessation  Onset: at 17.  Description: pt is here today to discuss medications to help " him quit smoking      Down to 10-12 a day. Has tried chantix in the past, around day 8 had to change his job. The dreams were bad and the mood swings were tough. Patches work for flying. Gum sometimes works, or sometimes to strong. Has been getting lozenges OTC and working well. Has chronic cough, worse when he gets home. Also after a shower will cough then its better. Breathing well. Has a pulsox at home, never saw low sats with smoking cigarettes or cannibis.     Cannibis. Is 1-2 puff a day. Has started to phase that out. Will start to get panic atticks if he smokes too much. Started age 19  Using delta HT OTC.      He was told in 2014 he had high cholesterol. Returned a year later and it was lower. Eating habits only recently improved.   Today BP is slightly elevated.   Hx of depression: last year took a guided [employer] therapy session with his partner. Did one in June and August 2020. It changed his life, no major depressive symptoms. Much improved from what it was.   He tries to remember sunscreen. Family hx of skin cancer.   Some prostate issues in 2018: started out as testicular pain, would end up in urgent care. First time prostatitis, then returned 2-3 more times, 3rd time antibiotics didn't work. That has been good for the pas 1-2 years.   Was dx with ADD around age 17-18. Was medicated for a bit, started smoking cannibis, stopped wellbutrin.   Hx of opioid abuse.     Hx of back spasms. Does back stretching             Review of Systems   Constitutional, HEENT, cardiovascular, pulmonary, gi and gu systems are negative, except as otherwise noted.      Objective    There were no vitals taken for this visit.  There is no height or weight on file to calculate BMI.  Physical Exam   GENERAL: healthy, alert and no distress  RESP: lungs clear to auscultation - no rales, rhonchi or wheezes  CV: regular rate and rhythm, normal S1 S2, no S3 or S4, no murmur, click or rub  ABDOMEN: soft, nontender, no hepatosplenomegaly,  no masses and bowel sounds normal  MS: no gross musculoskeletal defects noted, no edema  Psych: Pleasant, normal affect.  No thoughts of hurting himself    n/a            Answers for HPI/ROS submitted by the patient on 6/9/2021   If you checked off any problems, how difficult have these problems made it for you to do your work, take care of things at home, or get along with other people?: Not difficult at all  PHQ9 TOTAL SCORE: 2  KAYLIE 7 TOTAL SCORE: 0

## 2021-06-09 ENCOUNTER — OFFICE VISIT (OUTPATIENT)
Dept: FAMILY MEDICINE | Facility: CLINIC | Age: 31
End: 2021-06-09
Payer: COMMERCIAL

## 2021-06-09 VITALS
DIASTOLIC BLOOD PRESSURE: 60 MMHG | HEIGHT: 71 IN | SYSTOLIC BLOOD PRESSURE: 130 MMHG | OXYGEN SATURATION: 97 % | BODY MASS INDEX: 27.79 KG/M2 | WEIGHT: 198.5 LBS | RESPIRATION RATE: 12 BRPM | HEART RATE: 75 BPM

## 2021-06-09 DIAGNOSIS — M62.830 LUMBAR PARASPINAL MUSCLE SPASM: ICD-10-CM

## 2021-06-09 DIAGNOSIS — F11.11 HISTORY OF OPIOID ABUSE (H): ICD-10-CM

## 2021-06-09 DIAGNOSIS — Z72.0 TOBACCO ABUSE: Primary | ICD-10-CM

## 2021-06-09 DIAGNOSIS — Z86.59 HISTORY OF DEPRESSION: ICD-10-CM

## 2021-06-09 DIAGNOSIS — Z86.39 HISTORY OF HYPERLIPIDEMIA: ICD-10-CM

## 2021-06-09 PROCEDURE — 99214 OFFICE O/P EST MOD 30 MIN: CPT | Performed by: NURSE PRACTITIONER

## 2021-06-09 RX ORDER — POLYETHYLENE GLYCOL 3350 17 G
2 POWDER IN PACKET (EA) ORAL
Qty: 90 LOZENGE | Refills: 3 | Status: SHIPPED | OUTPATIENT
Start: 2021-06-09

## 2021-06-09 RX ORDER — CYCLOBENZAPRINE HCL 5 MG
TABLET ORAL
Qty: 30 TABLET | Refills: 1 | Status: SHIPPED | OUTPATIENT
Start: 2021-06-09

## 2021-06-09 ASSESSMENT — ANXIETY QUESTIONNAIRES
7. FEELING AFRAID AS IF SOMETHING AWFUL MIGHT HAPPEN: NOT AT ALL
GAD7 TOTAL SCORE: 0
5. BEING SO RESTLESS THAT IT IS HARD TO SIT STILL: NOT AT ALL
4. TROUBLE RELAXING: NOT AT ALL
GAD7 TOTAL SCORE: 0
GAD7 TOTAL SCORE: 0
2. NOT BEING ABLE TO STOP OR CONTROL WORRYING: NOT AT ALL
3. WORRYING TOO MUCH ABOUT DIFFERENT THINGS: NOT AT ALL
7. FEELING AFRAID AS IF SOMETHING AWFUL MIGHT HAPPEN: NOT AT ALL
1. FEELING NERVOUS, ANXIOUS, OR ON EDGE: NOT AT ALL
6. BECOMING EASILY ANNOYED OR IRRITABLE: NOT AT ALL

## 2021-06-09 ASSESSMENT — PATIENT HEALTH QUESTIONNAIRE - PHQ9
10. IF YOU CHECKED OFF ANY PROBLEMS, HOW DIFFICULT HAVE THESE PROBLEMS MADE IT FOR YOU TO DO YOUR WORK, TAKE CARE OF THINGS AT HOME, OR GET ALONG WITH OTHER PEOPLE: NOT DIFFICULT AT ALL
SUM OF ALL RESPONSES TO PHQ QUESTIONS 1-9: 2
SUM OF ALL RESPONSES TO PHQ QUESTIONS 1-9: 2

## 2021-06-09 ASSESSMENT — PAIN SCALES - GENERAL: PAINLEVEL: NO PAIN (0)

## 2021-06-09 ASSESSMENT — MIFFLIN-ST. JEOR: SCORE: 1882.52

## 2021-06-10 ASSESSMENT — PATIENT HEALTH QUESTIONNAIRE - PHQ9: SUM OF ALL RESPONSES TO PHQ QUESTIONS 1-9: 2

## 2021-06-10 ASSESSMENT — ANXIETY QUESTIONNAIRES: GAD7 TOTAL SCORE: 0

## 2021-10-02 ENCOUNTER — HEALTH MAINTENANCE LETTER (OUTPATIENT)
Age: 31
End: 2021-10-02

## 2022-01-22 ENCOUNTER — HEALTH MAINTENANCE LETTER (OUTPATIENT)
Age: 32
End: 2022-01-22

## 2022-09-03 ENCOUNTER — HEALTH MAINTENANCE LETTER (OUTPATIENT)
Age: 32
End: 2022-09-03

## 2022-09-04 ENCOUNTER — OFFICE VISIT (OUTPATIENT)
Dept: URGENT CARE | Facility: URGENT CARE | Age: 32
End: 2022-09-04
Payer: COMMERCIAL

## 2022-09-04 VITALS
OXYGEN SATURATION: 97 % | TEMPERATURE: 98.3 F | SYSTOLIC BLOOD PRESSURE: 131 MMHG | WEIGHT: 223 LBS | HEART RATE: 75 BPM | BODY MASS INDEX: 31.1 KG/M2 | DIASTOLIC BLOOD PRESSURE: 80 MMHG

## 2022-09-04 DIAGNOSIS — L02.416 ABSCESS OF LEFT THIGH: Primary | ICD-10-CM

## 2022-09-04 PROCEDURE — 87077 CULTURE AEROBIC IDENTIFY: CPT | Performed by: FAMILY MEDICINE

## 2022-09-04 PROCEDURE — 87070 CULTURE OTHR SPECIMN AEROBIC: CPT | Performed by: FAMILY MEDICINE

## 2022-09-04 PROCEDURE — 99213 OFFICE O/P EST LOW 20 MIN: CPT | Performed by: FAMILY MEDICINE

## 2022-09-04 RX ORDER — SULFAMETHOXAZOLE/TRIMETHOPRIM 800-160 MG
1 TABLET ORAL 2 TIMES DAILY
Qty: 20 TABLET | Refills: 0 | Status: SHIPPED | OUTPATIENT
Start: 2022-09-04 | End: 2022-09-14

## 2022-09-04 NOTE — PROGRESS NOTES
Subjective: Patient has often had bad boils and he has a bad one in his left thigh and a few others elsewhere that are not opened up at this point opened up a couple days ago, drained a little pus.  He does not feel sick yet.  He is concerned about MRSA.  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    Objective: There is a 2 and half centimeter boil on the left lateral thigh with a little pus in it.  I got a culture.    Assessment and plan: Large boil left thigh, will treat for possible staph, Septra for possible MRSA.  Change based on culture.

## 2022-09-06 LAB — BACTERIA ABSC ANAEROBE+AEROBE CULT: ABNORMAL

## 2023-04-29 ENCOUNTER — HEALTH MAINTENANCE LETTER (OUTPATIENT)
Age: 33
End: 2023-04-29

## 2024-07-06 ENCOUNTER — HEALTH MAINTENANCE LETTER (OUTPATIENT)
Age: 34
End: 2024-07-06

## 2025-07-13 ENCOUNTER — HEALTH MAINTENANCE LETTER (OUTPATIENT)
Age: 35
End: 2025-07-13